# Patient Record
Sex: MALE | Race: WHITE | NOT HISPANIC OR LATINO | Employment: OTHER
[De-identification: names, ages, dates, MRNs, and addresses within clinical notes are randomized per-mention and may not be internally consistent; named-entity substitution may affect disease eponyms.]

---

## 2018-12-28 ENCOUNTER — RECORDS - HEALTHEAST (OUTPATIENT)
Dept: ADMINISTRATIVE | Facility: OTHER | Age: 68
End: 2018-12-28

## 2019-07-19 ENCOUNTER — RECORDS - HEALTHEAST (OUTPATIENT)
Dept: ADMINISTRATIVE | Facility: OTHER | Age: 69
End: 2019-07-19

## 2019-11-08 ENCOUNTER — HEALTH MAINTENANCE LETTER (OUTPATIENT)
Age: 69
End: 2019-11-08

## 2020-02-23 ENCOUNTER — HEALTH MAINTENANCE LETTER (OUTPATIENT)
Age: 70
End: 2020-02-23

## 2020-12-06 ENCOUNTER — HEALTH MAINTENANCE LETTER (OUTPATIENT)
Age: 70
End: 2020-12-06

## 2021-04-11 ENCOUNTER — HEALTH MAINTENANCE LETTER (OUTPATIENT)
Age: 71
End: 2021-04-11

## 2021-06-02 ENCOUNTER — RECORDS - HEALTHEAST (OUTPATIENT)
Dept: ADMINISTRATIVE | Facility: CLINIC | Age: 71
End: 2021-06-02

## 2021-09-25 ENCOUNTER — HEALTH MAINTENANCE LETTER (OUTPATIENT)
Age: 71
End: 2021-09-25

## 2022-05-07 ENCOUNTER — HEALTH MAINTENANCE LETTER (OUTPATIENT)
Age: 72
End: 2022-05-07

## 2023-04-22 ENCOUNTER — HEALTH MAINTENANCE LETTER (OUTPATIENT)
Age: 73
End: 2023-04-22

## 2023-05-23 ENCOUNTER — HOSPITAL ENCOUNTER (EMERGENCY)
Facility: CLINIC | Age: 73
Discharge: HOME | End: 2023-05-23
Attending: EMERGENCY MEDICINE | Admitting: EMERGENCY MEDICINE
Payer: COMMERCIAL

## 2023-05-23 VITALS
OXYGEN SATURATION: 99 % | TEMPERATURE: 97.8 F | HEIGHT: 65 IN | SYSTOLIC BLOOD PRESSURE: 122 MMHG | HEART RATE: 70 BPM | BODY MASS INDEX: 24.16 KG/M2 | WEIGHT: 145 LBS | DIASTOLIC BLOOD PRESSURE: 86 MMHG | RESPIRATION RATE: 16 BRPM

## 2023-05-23 DIAGNOSIS — S61.011A LACERATION OF RIGHT THUMB WITHOUT FOREIGN BODY WITHOUT DAMAGE TO NAIL, INITIAL ENCOUNTER: ICD-10-CM

## 2023-05-23 PROCEDURE — 250N000009 HC RX 250: Performed by: EMERGENCY MEDICINE

## 2023-05-23 PROCEDURE — 12002 RPR S/N/AX/GEN/TRNK2.6-7.5CM: CPT

## 2023-05-23 PROCEDURE — 99283 EMERGENCY DEPT VISIT LOW MDM: CPT

## 2023-05-23 RX ORDER — GINSENG 100 MG
CAPSULE ORAL ONCE
Status: COMPLETED | OUTPATIENT
Start: 2023-05-23 | End: 2023-05-23

## 2023-05-23 RX ADMIN — BACITRACIN: 500 OINTMENT TOPICAL at 15:16

## 2023-05-23 ASSESSMENT — ACTIVITIES OF DAILY LIVING (ADL): ADLS_ACUITY_SCORE: 33

## 2023-05-23 ASSESSMENT — ENCOUNTER SYMPTOMS: WOUND: 1

## 2023-05-23 NOTE — ED PROVIDER NOTES
EMERGENCY DEPARTMENT ENCOUNTER      NAME: Jeremías Shah  AGE: 72 year old male  YOB: 1950  MRN: [id number]  EVALUATION DATE & TIME: 5/23/2023  1:43 PM    PCP: Simone Altman    ED PROVIDER: Pedro Mora M.D.      Chief Complaint   Patient presents with     Laceration     Right thumb         IMPRESSION  1. Laceration of right thumb without foreign body without damage to nail, initial encounter        PLAN  - routine non-absorbable sutured wound cares  - suture removal in 7 days in PCP clinic (3 total sutures)  - discharge to home      ED COURSE & MEDICAL DECISION MAKING    72yoM right handed with tetanus up to date presenting with simple 3cm laceration to pad of right thumb; sliced on blade while cleaning dishes. No other injury. No concern for vascular, tendon, bone, nerve injury. Laceration cleansed & repaired with 3 sutures without difficulty; dressed with bacitracin & clean bandage. Patient comfortable with discharge at this time. Return precautions and need for PCP follow up discussed and understood. No further questions at the time of discharge.      1:56 PM  Introduced myself to the patient, obtained history of present illness, and performed initial physical exam at this time.     2:50 PM I performed laceration repair.    --------------------------------------------------------------------------------   --------------------------------------------------------------------------------       This patient involved a high degree of complexity in medical decision making, as significant risks were present and assessed. Recent encounters & results in medical record reviewed by me.    All workup (i.e. any EKG/labs/imaging as per charting below) reviewed and independently interpreted by me. See respective sections for details.    Broad differential considered for this patient presenting, including but not limited to:  Superficial laceration, tendon injury, neurovascular injury, bony injury, nailbed  injury, retained foreign body       See additional MDM below if interested.      MEDICATIONS GIVEN IN THE EMERGENCY DEPARTMENT  Medications   bacitracin ointment ( Topical $Given 5/23/23 8231)       NEW PRESCRIPTIONS STARTED AT TODAY'S ER VISIT  There are no discharge medications for this patient.          =================================================================      HPI  Patient information was obtained from: Patient    Use of : N/A      Jeremías Shah is a 72 year old male with a pertinent history of platelet function defect, Raynaud's pheno,empm. Dermatitis,  who presents to this ED ambulatory for evaluation of laceration to right thumb    Patient states he was adjusting the blade of a  when he accidentally cut his thumb on the blade. 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     Per wife, the patient's last tetanus vaccine was in 2019        REVIEW OF SYSTEMS   Review of Systems   Skin: Positive for wound (laceration to left hand thumb).   All other systems reviewed and are negative.    All other systems reviewed and are negative except as noted above in HPI.          --------------- MEDICAL HISTORY ---------------  PAST MEDICAL HISTORY:  Reviewed independently by me.  No past medical history on file.  Patient Active Problem List   Diagnosis     Hypothyroidism     Sinus Bradycardia     Acute Thyroiditis     Raynaud's Phenomenon     Allergic Rhinitis     Dermatitis     Snoring (Symptom)       PAST SURGICAL HISTORY:  Reviewed independently by me.  Reviewed. No pertinent past surgical history.    CURRENT MEDICATIONS:    Reviewed independently by me.  No current facility-administered medications for this encounter.  No current outpatient medications on file.    ALLERGIES:  Reviewed independently by me.  Allergies   Allergen Reactions     Omeprazole        FAMILY HISTORY:  Reviewed independently by me.  Reviewed. No pertinent past family history.    SOCIAL HISTORY:  "  Reviewed independently by me.  Social History     Socioeconomic History     Marital status:        --------------- PHYSICAL EXAM ---------------  Nursing notes and vitals independently reviewed by me.  VITALS:  Vitals:    05/23/23 1304 05/23/23 1305   BP:  122/86   Pulse:  70   Resp:  16   Temp:  97.8  F (36.6  C)   SpO2:  99%   Weight: 65.8 kg (145 lb)    Height: 1.651 m (5' 5\")        PHYSICAL EXAM:    General:  alert, interactive, no distress  Eyes:  conjunctivae clear, conjugate gaze  HENT:  atraumatic, nose with no rhinorrhea, oropharynx clear  Neck:  no meningismus  Cardiovascular:  HR 70s during exam, regular rhythm, no murmurs, brisk cap refill  Chest:  no chest wall tenderness  Pulmonary:  no stridor, normal phonation, normal work of breathing, clear lungs bilaterally  Abdomen:  soft, nondistended, nontender  :  no CVA tenderness  Back:  no midline spinal tenderness  Musculoskeletal:   Right thumb 3cm oblique laceration to thumb pad, no active bleeding or tenderness. CMS intact   Skin:  warm, dry, no rash  Neuro:  awake, alert, answers questions appropriately, follows commands, moves all limbs, 5/5 strength throughout right thumb  Psych:  calm, normal affect      --------------- RESULTS ---------------  PROCEDURES:   [facility]    -Laceration Repair    Date/Time: 5/23/2023 2:30 PM    Performed by: Pedro Mora MD  Authorized by: Pedro Mora MD    Risks, benefits and alternatives discussed.      UNIVERSAL PROTOCOL   Site Marked: NA  Prior Images Obtained and Reviewed:  NA  Required items: Required blood products, implants, devices and special equipment available    Patient identity confirmed:  Verbally with patient  NA - No sedation, light sedation, or local anesthesia  Confirmation Checklist:  Patient's identity using two indicators and relevant allergies  Universal Protocol: the Joint Commission Universal Protocol was followed      ANESTHESIA (see MAR for " exact dosages):     Anesthesia method:  Nerve block    Block location:  Right thumb    Block needle gauge:  27 G    Block anesthetic:  Lidocaine 1% w/o epi    Block technique:  Digital block    Block injection procedure:  Anatomic landmarks identified, anatomic landmarks palpated, introduced needle, negative aspiration for blood and incremental injection    Block outcome:  Anesthesia achieved      LACERATION DETAILS     Location:  Finger    Finger location:  R thumb    Length (cm):  3    Depth (mm):  1    REPAIR TYPE:     Repair type:  Simple      EXPLORATION:     Wound exploration: wound explored through full range of motion and entire depth of wound probed and visualized      Wound extent: areolar tissue violated      Wound extent: fascia not violated, no foreign body, no signs of injury, no nerve damage, no tendon damage, no underlying fracture and no vascular damage      Contaminated: no      TREATMENT:     Area cleansed with:  Shasha    Amount of cleaning:  Standard    Irrigation solution:  Sterile saline    Irrigation method:  Syringe    Visualized foreign bodies/material removed: no      SKIN REPAIR     Repair method:  Sutures    Suture size:  5-0    Suture material:  Nylon    Suture technique:  Simple interrupted    Number of sutures:  3    APPROXIMATION     Approximation:  Close    POST-PROCEDURE DETAILS     Dressing:  Antibiotic ointment and non-adherent dressing        PROCEDURE    Patient Tolerance:  Patient tolerated the procedure well with no immediate complications             --------------- ADDITIONAL MDM ---------------  History:  - Supplemental history from:       -- see above charting, if applicable: patient, family (wife)  - External Record(s) reviewed:       -- see above charting, if applicable       -- Inpatient/outpatient record (vaccine record, clinic visit 4/26/23)    Workup:  - Chart documentation above includes differential considered and any EKGs or imaging independently interpreted  by provider.  - In additional to work up documented, I considered the following work up:       -- see above charting, if applicable    External Consultation:  - Discussion of management with another provider:       -- see above charting, if applicable (X-rays)    Complicating Factors:  - Care impacted by chronic illness:       -- see above MDM, past medical history, & problem list  - Care affected by social determinants of health:       -- see above social history    Disposition Considerations:  - Discharge       -- I considered admission given that the patient came to the Emergency Department, but ultimately discharged the patient per decision making above       -- I recommended the patient continue their current prescription strength medication(s) as charted above in current medications list       -- I recommended over-the-counter medication(s) as charted above & in discharge instructions         I, Luis Bliss, am serving as a scribe to document services personally performed by Dr. Pedro Mora based on my observation and the provider's statements to me. I, Pedro Mora MD attest that Luis Bliss is acting in a scribe capacity, has observed my performance of the services and has documented them in accordance with my direction.      Pedro Mora MD  05/23/23  Emergency Medicine  [facility] EMERGENCY ROOM  [address] [id number] [phone]  Dept: [phone]       Pedro Mora MD  05/23/23 4565

## 2023-05-23 NOTE — ED TRIAGE NOTES
cut thumb on on  when removing the blade. Cut right thumb. Has plt disorder which makes clotting difficult. Currently bandaged in triage.

## 2023-06-02 ENCOUNTER — HEALTH MAINTENANCE LETTER (OUTPATIENT)
Age: 73
End: 2023-06-02

## 2024-06-29 ENCOUNTER — HEALTH MAINTENANCE LETTER (OUTPATIENT)
Age: 74
End: 2024-06-29

## 2025-07-13 ENCOUNTER — HEALTH MAINTENANCE LETTER (OUTPATIENT)
Age: 75
End: 2025-07-13